# Patient Record
Sex: FEMALE | Race: WHITE | Employment: OTHER | URBAN - METROPOLITAN AREA
[De-identification: names, ages, dates, MRNs, and addresses within clinical notes are randomized per-mention and may not be internally consistent; named-entity substitution may affect disease eponyms.]

---

## 2024-03-14 ENCOUNTER — INITIAL CONSULT (OUTPATIENT)
Dept: SURGERY | Age: 84
End: 2024-03-14

## 2024-03-14 VITALS
RESPIRATION RATE: 18 BRPM | HEART RATE: 82 BPM | SYSTOLIC BLOOD PRESSURE: 150 MMHG | WEIGHT: 179 LBS | DIASTOLIC BLOOD PRESSURE: 80 MMHG | HEIGHT: 62 IN | BODY MASS INDEX: 32.94 KG/M2

## 2024-03-14 DIAGNOSIS — Z85.3 PERSONAL HISTORY OF BREAST CANCER: ICD-10-CM

## 2024-03-14 DIAGNOSIS — N64.59 ABNORMAL BREAST EXAM: Primary | ICD-10-CM

## 2024-03-14 NOTE — PROGRESS NOTES
atraumatic.   Eyes: EOM are normal. Pupils are equal, round, and reactive to light.   Neck: Neck supple. Notracheal deviation present.   Cardiovascular: regular rate.  Extremities appearwell perfused.  Pulmonary: respirations are non-labored and without audible distress  Lymphatics: no palpable axillary or cervical lymphadenopathy.  Skin: No rash noted. No erythema.   Neurologic: alert and oriented.        Assessment/Plan: Ms. Murphy is a 83 y.o. year-old woman who presents  with a personal history of bilateral breast cancer and new skin changes of the left postsurgical chest wall    I reviewed with Ms. Murphy and her granddaughter my physical exam findings.  While this could resemble a reactive insect bite as she was considering, I discussed ruling out recurrent malignancy.  We also discussed the potential for discordance and recommendation to fully excise this finding in the operating room.  Today we discussed a punch biopsy in the office.  We discussed the technique, risks and benefits.  She verbalized understanding and agreement to proceed.  See notes below.    We discussed potential workup based on biopsy results.      At this time:  We will see Ms. Murphy back on an as-needed basis based on the biopsy results.  Stitches will be removed in 7 to 10 days.      All of the patient's questions were answered at this time however, she was encouraged to call the office with any further inquiries. Approximately 45 minutes of time were spent in preparation, direct patient contact, care coordination, documentation and activities otherwise related to this encounter.     Beatrice Murphy  YOB: 1940  [id number]    Pre-operative Diagnosis: left chest wall lesion    Post-operative Diagnosis: Same    Procedure: left lateral chest wall 5 mm x2 punch biopsy    Anesthesia: Local using 5 mL of 1% lidocaine with epinephrine    Surgeons/Assistants: Dr. Kristen Bedoya    Estimated Blood Loss: 10